# Patient Record
Sex: FEMALE | Race: WHITE
[De-identification: names, ages, dates, MRNs, and addresses within clinical notes are randomized per-mention and may not be internally consistent; named-entity substitution may affect disease eponyms.]

---

## 2020-09-21 ENCOUNTER — HOSPITAL ENCOUNTER (OUTPATIENT)
Dept: HOSPITAL 97 - 2ND | Age: 50
Setting detail: OBSERVATION
LOS: 1 days | Discharge: HOME | End: 2020-09-22
Attending: HOSPITALIST
Payer: COMMERCIAL

## 2020-09-21 VITALS — BODY MASS INDEX: 21.2 KG/M2

## 2020-09-21 DIAGNOSIS — F10.10: ICD-10-CM

## 2020-09-21 DIAGNOSIS — Z83.6: ICD-10-CM

## 2020-09-21 DIAGNOSIS — Z71.6: ICD-10-CM

## 2020-09-21 DIAGNOSIS — Z82.49: ICD-10-CM

## 2020-09-21 DIAGNOSIS — E78.5: ICD-10-CM

## 2020-09-21 DIAGNOSIS — F17.200: ICD-10-CM

## 2020-09-21 DIAGNOSIS — R07.89: Primary | ICD-10-CM

## 2020-09-21 DIAGNOSIS — Z20.828: ICD-10-CM

## 2020-09-21 DIAGNOSIS — I10: ICD-10-CM

## 2020-09-21 LAB — TSH SERPL DL<=0.05 MIU/L-ACNC: 11.9 UIU/ML (ref 0.36–3.74)

## 2020-09-21 PROCEDURE — 84443 ASSAY THYROID STIM HORMONE: CPT

## 2020-09-21 PROCEDURE — 84484 ASSAY OF TROPONIN QUANT: CPT

## 2020-09-21 PROCEDURE — 85730 THROMBOPLASTIN TIME PARTIAL: CPT

## 2020-09-21 PROCEDURE — 36415 COLL VENOUS BLD VENIPUNCTURE: CPT

## 2020-09-21 PROCEDURE — 84100 ASSAY OF PHOSPHORUS: CPT

## 2020-09-21 PROCEDURE — 80061 LIPID PANEL: CPT

## 2020-09-21 PROCEDURE — 85025 COMPLETE CBC W/AUTO DIFF WBC: CPT

## 2020-09-21 PROCEDURE — 80048 BASIC METABOLIC PNL TOTAL CA: CPT

## 2020-09-21 PROCEDURE — 84439 ASSAY OF FREE THYROXINE: CPT

## 2020-09-21 PROCEDURE — 94760 N-INVAS EAR/PLS OXIMETRY 1: CPT

## 2020-09-21 PROCEDURE — 83735 ASSAY OF MAGNESIUM: CPT

## 2020-09-21 PROCEDURE — 85610 PROTHROMBIN TIME: CPT

## 2020-09-21 RX ADMIN — METOPROLOL TARTRATE SCH MG: 25 TABLET ORAL at 17:23

## 2020-09-21 RX ADMIN — Medication SCH ML: at 20:54

## 2020-09-21 RX ADMIN — ENOXAPARIN SODIUM SCH: 40 INJECTION SUBCUTANEOUS at 17:46

## 2020-09-21 NOTE — P.HP
Certification for Inpatient


Patient admitted to: Observation


With expected LOS: <2 Midnights


Practitioner: I am a practitioner with admitting privileges, knowledge of 

patient current condition, hospital course, and medical plan of care.


Services: Services provided to patient in accordance with Admission requirements

found in Title 42 Section 412.3 of the Code of Federal Regulations





Patient History


Date of Service: 09/21/20


Primary Care Provider: Anny Salomon


Reason for admission: Chest pain, rule out ACS


History of Present Illness: 





49-year-old female, PMH:  HTN, HLD, tobacco use, who is a transfer from [facility]

due to concern for ACS.  Patient states she suddenly had left sided chest pain 

this morning at 9:00 a.m. 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

and ordering.  She states she was on the delivery with some physical activity 

when this occurred.  She states it progressively got worse to a 9/10.  She 

states she suddenly broke out into a sweat, and had difficulty breathing.  She 

states the pain radiated to the jaw.  Pain is worsened by deep breathing, only 

slightly improved with some of the medication she was given in the ED.  It is 

currently a 5/10.


EKG in the ED was reported as normal, initial troponin was negative.  Lab work 

was otherwise unremarkable per ED physician's report.  She was given aspirin and

Lovenox.  ED physician spoke with cardiology, Dr. Omalley and will see the 

patient in consult.





Allergies





No Known Allergies Allergy (Unverified 09/21/20 14:52)


   





Home Medications: 








Lisinopril [Zestril] 20 mg PO BID 09/21/20 








- Past Medical/Surgical History


Has patient received pneumonia vaccine in the past: No


Diabetic: No


-: HTN


-: HLD


-: carpal tunnel L hand


-: back surgery


-: skin grafting L hand





- Family History


  ** Father


Notes: copd





  ** Mother


-: Heart disease (CABG in his 30s)


Notes: brain tumor





- Social History


Smoking Status: Current every day smoker (1 pack per day, 38 years)


Counseled patient to stop smoking for: more than 10 minutes


Smoking therapy provided: Yes


Patient receptive to therapy: No


Alcohol use: Yes


CD- Drugs: No


Caffeine use: Yes


Place of Residence: Home





Review of Systems


10-point ROS is otherwise unremarkable





Physical Examination





- Physical Exam


General: Alert, In no apparent distress


HEENT: Mucous membr. moist/pink, Sclerae nonicteric


Neck: Supple, JVD not distended


Respiratory: Clear to auscultation bilaterally, Normal air movement


Cardiovascular: No edema, Regular rate/rhythm, Normal S1 S2, Other (Mild 

tenderness to palpation of mediastinum)


Gastrointestinal: Soft and benign, Non-distended, No tenderness


Musculoskeletal: No erythema, No tenderness


Integumentary: No rashes


Neurological: Normal speech, Normal affect





Assessment and Plan





- Advance Directives


Does patient have a Living Will: No


Does patient have a Durable POA for Healthcare: No


Physician Review Additional Text: 





Chest pain, rule out ACS


Alcohol abuse


HTN


HLD


Tobacco use





Chest pain, rule out ACS


-new onset, persistent, very minimally reproducible on exam with palpation 

(patient reported it was similar but only a 1/10)


-given family history and risk factors, concern for acute coronary syndrome


-continue aspirin, metoprolol, Lovenox, morphine


-cardiology consulted


-echocardiogram ordered


-lipid panel in a.m.


-EKG in a.m.


-NPO at midnight for possible stress test





Alcohol abuse


-patient reports she drinks 3 shots per day


-last went a few days without a drink approximately 1 year ago


-reports no history of alcohol withdrawal


-will monitor on protocol





 HTN


-confirm and continue home lisinopril





HLD


-high-intensity statin for now, can confirm home statin dose





Tobacco use


-offered nicotine patch, patient refused


-counseled on smoking cessation >10 min





Dispo: possible DC tomorrow after above workup





Time Spent Managing Pts Care (In Minutes): 55

## 2020-09-21 NOTE — XMS REPORT
Continuity of Care Document

                          Created on:2020



Patient:CLAUDIA OWENS

Sex:Female

:1970

External Reference #:[id number]





Demographics







                          Address                   [address] [place] [address]

 

                          Home Phone                [phone] CELL

 

                          Work Phone                [phone]

 

                          Mobile Phone              [phone]

 

                          Email Address             [email or web address]

 

                          Preferred Language        en

 

                          Marital Status            Unknown

 

                          Alevism Affiliation     Unknown

 

                          Race                      Unknown

 

                          Additional Race(s)        White



                                                    Unavailable

 

                          Ethnic Group              Unknown









Author







                          Organization              [facility]

t

 

                          Address                   [address]



                                                    [place] [address]

 

                          Phone                     [phone]









Support







                Name            Relationship    Address         Phone

 

                Rodrigo         Other           Unavailable     [phone]

 

                Unavailable     Mother          Unavailable     Unavailable









Care Team Providers







                    Name                Role                Phone

 

                    Radiology           Attending Clinician Unavailable









Problems







       Condition Condition Condition Status Onset  Resolution Last   Treating Co

mments 

Source



       Name   Details Category        Date   Date   Treatment Clinician        



                                                 Date                 

 

       Elevated Elevated Problem Active                                    [facility]

[facility]



       glucose glucose                                                  [facility]



       level  level                                                   [facility]

 

       HTN    HTN    Problem Active                                    [facility]



       (hypertens (hypertens                                                  Debbie

kes -



       ion)   ion)                                                    [facility]

 

       Serum  Serum  Problem Active                                    [facility]



       calcium calcium                                                  [facility]



       elevated elevated                                                  [facility]

a



                                                                      [facility]



                                                                      [facility]

 

       Raynaud's Raynaud's Problem Active                                    [facility]



       syndrome syndrome                                                  [facility] 

-



                                                                      [facility]



                                                                      [facility]

 

       Upper  Upper  Problem Active                                    [facility]



       respirator respirator                                                  Debbie

kes -



       y tract y tract                                                  [facility]



       infection, infection,                                                  l



       unspecifie unspecifie                                                  Ou

tpati



       d type d type                                                  ent



                                                                      Clinics

 

       Cough  Cough  Diagnosis Active                                    [facility]



                                                                      [facility]



                                                                      [facility]



                                                                      [facility]

 

       Hot    Hot    Problem Active                                    [facility]



       flashes flashes                                                  [facility]



       due to due to                                                  [facility]



       menopause menopause                                                  l



                                                                      [facility]

 

       Pure   Pure   Diagnosis Active                                    [facility]



       hyperchole hyperchole                                                  Debbie

kes -



       sterolemia sterolemia                                                  Me

moria



                                                                      l



                                                                      [facility]

 

       Cigarette Cigarette Problem Active                                    [facility]



       nicotine nicotine                                                  [facility] 

-



       dependence dependence                                                  Me

moria



       without without                                                  l



       complicati complicati                                                  Ou

tpati



       on     on                                                      ent



                                                                      Clinics

 

       Screening Screening Problem Active                                    [facility]



       for breast for breast                                                  Debbie

kes -



       cancer cancer                                                  [facility]



                                                                      [facility]



                                                                      [facility]

 

       Abnormal Abnormal Problem Active                                    [facility]

t



       mammogram mammogram                                                  Luke

s -



                                                                      [facility]



                                                                      l



                                                                      [facility]

 

       Seasonal Seasonal Diagnosis Active                                    [facility]



       allergic allergic                                                  [facility] 

-



       rhinitis, rhinitis,                                                  Kermit

vee



       unspecifie unspecifie                                                  l



       d trigger d trigger                                                  Outp

at



                                                                      ent



                                                                      Clinics







Allergies, Adverse Reactions, Alerts

This patient has no known allergies or adverse reactions.



Medications







       Ordered Filled Start  Stop   Current Ordering Indication Dosage Frequency

 Signature

                    Comments            Components          Source



     Medication Medication Date Date Medication? Clinician                (SIG) 

          



     Name Name                                                   

 

     Glucosamine Glucosamine           Yes  Anny                not            

[facility]



                              [place]                defined           [facility]



                                                                 [facility]



                                                                 [facility]



                                                                 [facility]



                                                                 [facility]

 

     Lisinopril Lisinopril           Yes  Anny                take 1           

[facility]



                              [place]                tablet           [facility]



                                                  twice a           oria



                                                  day            l



                                                                 [facility]



                                                                 [facility]

 

     Pravastatin Pravastatin           Yes  Anny                1 tablet       

    [facility]



     Sodium Sodium                [place]                               [facility]



                                                                 [facility]



                                                                 [facility]



                                                                 [facility]



                                                                 [facility]



                                                                 [facility]







Procedures

This patient has no known procedures.



Encounters







        Start   End     Encounter Admission Attending Care    Care    Encounter 

Source



        Date/Time Date/Time Type    Type    Clinicians Facility Department ID   

   

 

        2020-06-10 2020-06-10 Outpatient                 [address]

[id number] [facility]



        08:40:00 08:40:00                         [facility]



                                                                        ent



                                                                        [facility]

 

        2020 [facility]         Radiology [facility]    [id number]

[id number] 



        10:00:00 23:59:00 Encounter                 SPECIALTY [id number]       

  



                                                CARE    [id number]         



                                                CENTER .[id number]         



                                                [address]             



                                                [facility]                   

 

        2020 Outpatient                 [address]old [address]

[id number] [facility]



        09:44:00 09:44:00                         [facility]



                                                                        ent



                                                                        [facility]

 

        2020 [facility]         Radiology [facility]    [id number]

[id number] 



        09:58:00 23:59:00 Encounter                 SPECIALTY [id number]       

  



                                                CARE    [id number]         



                                                CENTER .[id number]         



                                                [address]                   

 

        2020 Outpatient                 [address]

[id number] [facility]



        09:55:00 09:55:00                         [facility]



                                                                        ent



                                                                        [facility]

 

        2020 Outpatient                 [address]

[id number] [facility]



        10:54:00 10:54:00                         [facility]



                                                Medicine                 Outpati



                                                                        ent



                                                                        Clinics

 

        2019 Outpatient                 [facility] [address]

[id number] [facility]



        17:09:00 17:09:00                         t [facility]                 Outpati



                                                                        ent



                                                                        Clinics

 

        2019 Outpatient                 [facility] [address]

[id number] [facility]



        08:20:00 08:20:00                         t [facility]                 Outpati



                                                                        ent



                                                                        Clinics

 

        2019 Outpatient                 [facility] [address]

[id number] [facility]



        16:34:00 16:34:00                         t [facility]                 Outpati



                                                                        ent



                                                                        Clinics

 

        2019 Outpatient                 [facility] [address]

[id number] [facility]



        08:00:00 08:00:00                         t [facility]                 Outpati



                                                                        ent



                                                                        Clinics

 

        2019 Outpatient                 [facility] [address]

[id number] [facility]



        14:21:00 14:21:00                         t [facility]



                                                Medicine                 Outpati



                                                                        ent



                                                                        Clinics

 

        2019 Outpatient                 [facility] [address]

[id number] [facility]



        09:15:00 09:15:00                         t [facility]         [facility]



                                                [facility]    Clinic          [facility]



                                                                        Outpati



                                                                        ent



                                                                        Clinics

 

        2018 Outpatient                 [facility] [address]

[id number] [facility]



        16:42:00 16:42:00                         t [facility]                 Outpati



                                                                        ent



                                                                        Clinics







Results

This patient has no known results.

## 2020-09-21 NOTE — XMS REPORT
Continuity of Care Document

                          Created on:2020



Patient:CLAUDIA OWENS

Sex:Female

:1970

External Reference #:[id number]





Demographics







                          Address                   [address] [place] [address]

 

                          Home Phone                [phone] CELL

 

                          Work Phone                [phone]

 

                          Mobile Phone              [phone]

 

                          Email Address             [email or web address]

 

                          Preferred Language        en

 

                          Marital Status            Unknown

 

                          Anabaptist Affiliation     Unknown

 

                          Race                      Unknown

 

                          Additional Race(s)        White



                                                    Unavailable

 

                          Ethnic Group              Unknown









Author







                          Organization              [facility]

t

 

                          Address                   [address]



                                                    [place] [address]

 

                          Phone                     [phone]









Support







                Name            Relationship    Address         Phone

 

                Rodrigo         Other           Unavailable     [phone]

 

                Unavailable     Mother          Unavailable     Unavailable









Care Team Providers







                    Name                Role                Phone

 

                    Radiology           Attending Clinician Unavailable









Problems







       Condition Condition Condition Status Onset  Resolution Last   Treating Co

mments 

Source



       Name   Details Category        Date   Date   Treatment Clinician        



                                                 Date                 

 

       Elevated Elevated Problem Active                                    [facility]

[facility]



       glucose glucose                                                  [facility]



       level  level                                                   [facility]

 

       HTN    HTN    Problem Active                                    [facility]



       (hypertens (hypertens                                                  Debbie

kes -



       ion)   ion)                                                    [facility]



                                                                      [facility]

 

       Serum  Serum  Problem Active                                    [facility]



       calcium calcium                                                  [facility]



       elevated elevated                                                  [facility]

a



                                                                      [facility]

 

       Raynaud's Raynaud's Problem Active                                    [facility]



       syndrome syndrome                                                  [facility] 

-



                                                                      [facility]

 

       Upper  Upper  Problem Active                                    [facility]



       respirator respirator                                                  Debbie

kes -



       y tract y tract                                                  [facility]



       infection, infection,                                                  l



       unspecifie unspecifie                                                  Ou

tpati



       d type d type                                                  ent



                                                                      Clinics

 

       Cough  Cough  Diagnosis Active                                    [facility]



                                                                      [facility]



                                                                      [facility]

 

       Hot    Hot    Problem Active                                    [facility]



       flashes flashes                                                  [facility]



       due to due to                                                  [facility]



       menopause menopause                                                  [facility]



                                                                      [facility]

 

       Pure   Pure   Diagnosis Active                                    [facility]



       hyperchole hyperchole                                                  Debbie

kes -



       sterolemia sterolemia                                                  Me

moria



                                                                      l



                                                                      [facility]

 

       Cigarette Cigarette Problem Active                                    [facility]



       nicotine nicotine                                                  [facility] 

-



       dependence dependence                                                  Me

moria



       without without                                                  l



       complicati complicati                                                  Ou

tpati



       on     on                                                      ent



                                                                      Clinics

 

       Screening Screening Problem Active                                    [facility]



       for breast for breast                                                  Debbie

kes -



       cancer cancer                                                  [facility]



                                                                      [facility]

 

       Abnormal Abnormal Problem Active                                    [facility]

t



       mammogram mammogram                                                  [facility]

s -



                                                                      [facility]



                                                                      [facility]

 

       Seasonal Seasonal Diagnosis Active                                    [facility]



       allergic allergic                                                  [facility] 

-



       rhinitis, rhinitis,                                                  Kermit

vee



       unspecifie unspecifie                                                  l



       d trigger d trigger                                                  Outp

at



                                                                      ent



                                                                      Clinics







Allergies, Adverse Reactions, Alerts

This patient has no known allergies or adverse reactions.



Medications







       Ordered Filled Start  Stop   Current Ordering Indication Dosage Frequency

 Signature

                    Comments            Components          Source



     Medication Medication Date Date Medication? Clinician                (SIG) 

          



     Name Name                                                   

 

     Glucosamine Glucosamine           Yes  Anny                not            

[facility]



                              Waldorf                defined           [facility]



                                                                 [facility]



                                                                 [facility]

 

     Lisinopril Lisinopril           Yes  Anny                take 1           

[facility]



                              Waldorf                tablet           [facility]



                                                  twice a           oria



                                                  day            l



                                                                 [facility]

 

     Pravastatin Pravastatin           Yes  Anny                1 tablet       

    [facility]



     Sodium Sodium                Waldorf                               [facility]



                                                                 [facility]



                                                                 [facility]



                                                                 [facility]







Procedures

This patient has no known procedures.



Encounters







        Start   End     Encounter Admission Attending Care    Care    Encounter 

Source



        Date/Time Date/Time Type    Type    Clinicians Facility Department ID   

   

 

        2020-06-10 2020-06-10 Outpatient                 [address]

[id number] [facility]



        08:40:00 08:40:00                         [facility]



                                                                        ent



                                                                        [facility]

 

        2020 [facility]         Radiology [facility]    [id number]

[id number] 



        10:00:00 23:59:00 Encounter                 SPECIALTY [id number]       

  



                                                CARE    [id number]         



                                                CENTER .[id number]         



                                                [address]             



                                                [facility]                   

 

        2020 Outpatient                 [address]

[id number] [facility]



        09:44:00 09:44:00                         [facility]



                                                                        ent



                                                                        [facility]

 

        2020 [facility]         Radiology [facility]    [id number]

[id number] 



        09:58:00 23:59:00 Encounter                 SPECIALTY [id number]       

  



                                                CARE    [id number]         



                                                CENTER .[id number]         



                                                [address]                   

 

        2020 Outpatient                 [address]

[id number] [facility]



        09:55:00 09:55:00                         [facility]



                                                                        [facility]



                                                                        [facility]

 

        2020 Outpatient                 [address]

[id number] [facility]



        10:54:00 10:54:00                         [facility]



                                                Medicine                 Outpati



                                                                        ent



                                                                        Clinics

 

        2019 Outpatient                 [facility] [address]

[id number] [facility]



        17:09:00 17:09:00                         t [facility]                 Outpati



                                                                        ent



                                                                        Clinics

 

        2019 Outpatient                 [facility] [address]

[id number] [facility]



        08:20:00 08:20:00                         t [facility]                 Outpati



                                                                        ent



                                                                        Clinics

 

        2019 Outpatient                 [facility] [address]

[id number] [facility]



        16:34:00 16:34:00                         t [facility]                 Outpati



                                                                        ent



                                                                        Clinics

 

        2019 Outpatient                 [facility] [address]

[id number] [facility]



        08:00:00 08:00:00                         t [facility]                 Outpati



                                                                        ent



                                                                        Clinics

 

        2019 Outpatient                 [facility] [address]

[id number] [facility]



        14:21:00 14:21:00                         t [facility]



                                                Medicine                 Outpati



                                                                        ent



                                                                        Clinics

 

        2019 Outpatient                 [facility] [address]

[id number] [facility]



        09:15:00 09:15:00                         t [facility]         [facility]



                                                [facility]    Clinic          [facility]



                                                                        Outpati



                                                                        ent



                                                                        Clinics

 

        2018 Outpatient                 [facility] [address]

[id number] [facility]



        16:42:00 16:42:00                         t [facility]                 Outpati



                                                                        ent



                                                                        Clinics







Results

This patient has no known results.

## 2020-09-22 VITALS — SYSTOLIC BLOOD PRESSURE: 122 MMHG | TEMPERATURE: 97.6 F | DIASTOLIC BLOOD PRESSURE: 66 MMHG

## 2020-09-22 VITALS — OXYGEN SATURATION: 98 %

## 2020-09-22 LAB
BUN BLD-MCNC: 15 MG/DL (ref 7–18)
GLUCOSE SERPLBLD-MCNC: 78 MG/DL (ref 74–106)
HCT VFR BLD CALC: 30 % (ref 36–45)
HDLC SERPL-MCNC: 117 MG/DL (ref 40–60)
INR BLD: 0.89
LDLC SERPL CALC-MCNC: 63 MG/DL (ref ?–130)
LYMPHOCYTES # SPEC AUTO: 2.5 K/UL (ref 0.7–4.9)
MAGNESIUM SERPL-MCNC: 1.8 MG/DL (ref 1.8–2.4)
PMV BLD: 7.2 FL (ref 7.6–11.3)
POTASSIUM SERPL-SCNC: 3.9 MMOL/L (ref 3.5–5.1)
RBC # BLD: 3.11 M/UL (ref 3.86–4.86)

## 2020-09-22 RX ADMIN — METOPROLOL TARTRATE SCH: 25 TABLET ORAL at 03:15

## 2020-09-22 RX ADMIN — Medication SCH ML: at 09:00

## 2020-09-22 RX ADMIN — ENOXAPARIN SODIUM SCH MG: 40 INJECTION SUBCUTANEOUS at 09:35

## 2020-09-22 NOTE — P.DS
Admission Date: 09/21/20


Discharge Date: 09/22/20


Primary Care Provider: Anny Salomon


Discharge Condition: FAIR


Reason for Admission: Chest pain, rule out ACS


Brief History of Present Illness: 


49-year-old woman with a history of hypertension and hyperlipidemia presented to

the emergency department with a complaint of chest pain of sudden onset.  Workup

in the ED with troponin was negative, chest x-ray did not show any acute 

disease, EKG no significant ischemic changes.  The patient was placed under 

observation for ACS rule out.





Hospital Course: 


Troponin trended negative.  Patient was chest pain-free during the hospital 

stay.  The patient was seen in consultation by cardiology-Dr. Omalley.  Stress 

test was recommended which could not be done today because equipment 

maintenance. Dr. Omalley recommend outpatient stress test.  Patient requests to 

go home today.  She is discharged to do stress test as an outpatient.





Vital Signs/Physical Exam: 














Temp Pulse Resp BP Pulse Ox


 


 97.6 F   66   16   122/66   98 


 


 09/22/20 08:00  09/22/20 09:00  09/22/20 08:00  09/22/20 09:00  09/22/20 08:00








General: Alert, In no apparent distress


Neck: Supple


Respiratory: Clear to auscultation bilaterally, Normal air movement


Cardiovascular: No edema, Regular rate/rhythm, Normal S1 S2


Gastrointestinal: Normal bowel sounds, Soft and benign


Musculoskeletal: No swelling


Integumentary: No rashes


Neurological: Other (Nonfocal)


Laboratory Data at Discharge: 














WBC  7.7 K/uL (4.3-10.9)   09/22/20  03:49    


 


Hgb  10.6 g/dL (12.0-15.0)  L  09/22/20  03:49    


 


Hct  30.0 % (36.0-45.0)  L  09/22/20  03:49    


 


Plt Count  219 K/uL (152-406)   09/22/20  03:49    


 


PT  10.5 SECONDS (9.5-12.5)   09/22/20  03:49    


 


INR  0.89   09/22/20  03:49    


 


APTT  33.1 SECONDS (24.3-36.9)   09/22/20  03:49    


 


Sodium  137 mmol/L (136-145)   09/22/20  03:49    


 


Potassium  3.9 mmol/L (3.5-5.1)   09/22/20  03:49    


 


BUN  15 mg/dL (7-18)   09/22/20  03:49    


 


Creatinine  0.67 mg/dL (0.55-1.3)   09/22/20  03:49    


 


Glucose  78 mg/dL ()   09/22/20  03:49    


 


Phosphorus  Cancelled   09/22/20  05:00    


 


Magnesium  1.8 mg/dL (1.8-2.4)   09/22/20  03:49    


 


Troponin I  < 0.02 ng/mL (0.0-0.045)   09/21/20  22:05    


 


Triglycerides  63 mg/dL (<150)   09/22/20  03:49    


 


Cholesterol  193 mg/dL (<200)   09/22/20  03:49    


 


HDL Cholesterol  117 mg/dL (40-60)  H  09/22/20  03:49    


 


Cholesterol/HDL Ratio  1.65   09/22/20  03:49    








Home Medications: 








Lisinopril [Zestril] 20 mg PO BID 09/21/20 


Aspirin [Aspirin EC 81 MG] 81 mg PO DAILY #30 tablet. 09/22/20 





New Medications: 


Aspirin [Aspirin EC 81 MG] 81 mg PO DAILY #30 tablet.


Diet: AHA


Activity: Ad renato


Followup: 


Satinder Omalley MD [ACTIVE - CAN ADMIT] - 2-3 Days

## 2020-09-23 NOTE — CON
Date of Consultation:  09/22/2020



Reason For Consultation:  Chest pain.



History Of Present Illness:  Ms. Barr is a 49-year-old woman has a history of hypertension, dyslipi
demia, tobacco use.  She only takes lisinopril 20 mg b.i.d. at home for her blood pressure.  She work
s for [employer].  She lives in [place].  While she was working, she developed this substernal chest 
pressure that would get worse when she took a deep breath.  She got short of breath.  No nausea or vo
miting, but she did have some diaphoresis.  Denied any PND, orthopnea, pedal edema, palpitation, or s
yncope.  By the time she came to the emergency room at [place], she has had pain for the last 48 hours.
  Her EKG remained normal, her chest x-ray was normal, and her troponin was negative.  Dr. Castillo w
as still concerned because of her symptoms and she was sent to the [facility] for [facility]
er evaluation and treatment.



Past Medical History:  As stated above.



Allergies:  NONE.



Review of Systems:

Negative.



Social History:  Positive for tobacco.



Family History:  Positive for heart disease.



Physical Examination:

Vital Signs:  Stable.  She was afebrile. 

HEENT:  Negative. 

Neck:  Supple with no bruit. 

Chest:  Clear. 

Cardiac:  Revealed a regular rhythm and rate.  No murmurs, gallops, or rubs. 

Abdomen:  Benign. 

Extremities:  Revealed no clubbing, cyanosis, or edema.



Diagnostic Data:  Clearly normal except for TSH of 11.9.



Impression And Plan:  Atypical chest pain.  Her symptoms have been going on for more than 2 days and 
yet still she has normal EKG and a normal troponin.  Her chest x-ray was normal.  Her symptoms are mo
re pleuritic as her pain gets worse when she breathes.  This certainly could be gastric as well.  Nev
ertheless, she has many risk factors for heart disease including family history, positive tobacco, hy
pertension, and dyslipidemia.  I think she needs to have an echocardiogram and a nuclear stress test 
before making further decisions.  Her TSH is elevated and she may need to be on a low-dose Synthroid,
 but I would leave that up to Dr. Greene to decide.





NB/HARRISON

DD:  09/23/2020 03:51:15Voice ID:  [id number]

DT:  09/23/2020 04:32:25Report ID:  [id number]

## 2021-01-18 ENCOUNTER — HOSPITAL ENCOUNTER (EMERGENCY)
Dept: HOSPITAL 97 - ER | Age: 51
Discharge: HOME | End: 2021-01-18
Payer: COMMERCIAL

## 2021-01-18 VITALS — DIASTOLIC BLOOD PRESSURE: 70 MMHG | SYSTOLIC BLOOD PRESSURE: 128 MMHG | TEMPERATURE: 98.8 F | OXYGEN SATURATION: 97 %

## 2021-01-18 DIAGNOSIS — R07.9: Primary | ICD-10-CM

## 2021-01-18 DIAGNOSIS — I10: ICD-10-CM

## 2021-01-18 DIAGNOSIS — Z20.822: ICD-10-CM

## 2021-01-18 DIAGNOSIS — F17.210: ICD-10-CM

## 2021-01-18 DIAGNOSIS — R06.02: ICD-10-CM

## 2021-01-18 LAB
ALBUMIN SERPL BCP-MCNC: 4.2 G/DL (ref 3.4–5)
ALP SERPL-CCNC: 70 U/L (ref 45–117)
ALT SERPL W P-5'-P-CCNC: 46 U/L (ref 12–78)
AST SERPL W P-5'-P-CCNC: 38 U/L (ref 15–37)
BUN BLD-MCNC: 12 MG/DL (ref 7–18)
GLUCOSE SERPLBLD-MCNC: 80 MG/DL (ref 74–106)
HCT VFR BLD CALC: 31 % (ref 36–45)
INR BLD: 0.92
LYMPHOCYTES # SPEC AUTO: 2.2 K/UL (ref 0.7–4.9)
MAGNESIUM SERPL-MCNC: 1.4 MG/DL (ref 1.8–2.4)
NT-PROBNP SERPL-MCNC: 109 PG/ML (ref ?–125)
PMV BLD: 7.6 FL (ref 7.6–11.3)
POTASSIUM SERPL-SCNC: 3.9 MMOL/L (ref 3.5–5.1)
RBC # BLD: 3.22 M/UL (ref 3.86–4.86)
SARS-COV-2 RNA RESP QL NAA+PROBE: NEGATIVE
TROPONIN (EMERG DEPT USE ONLY): < 0.02 NG/ML (ref 0–0.04)

## 2021-01-18 PROCEDURE — 80048 BASIC METABOLIC PNL TOTAL CA: CPT

## 2021-01-18 PROCEDURE — 0240U: CPT

## 2021-01-18 PROCEDURE — 96372 THER/PROPH/DIAG INJ SC/IM: CPT

## 2021-01-18 PROCEDURE — 83880 ASSAY OF NATRIURETIC PEPTIDE: CPT

## 2021-01-18 PROCEDURE — 71045 X-RAY EXAM CHEST 1 VIEW: CPT

## 2021-01-18 PROCEDURE — 85025 COMPLETE CBC W/AUTO DIFF WBC: CPT

## 2021-01-18 PROCEDURE — 96375 TX/PRO/DX INJ NEW DRUG ADDON: CPT

## 2021-01-18 PROCEDURE — 96374 THER/PROPH/DIAG INJ IV PUSH: CPT

## 2021-01-18 PROCEDURE — 93005 ELECTROCARDIOGRAM TRACING: CPT

## 2021-01-18 PROCEDURE — 85610 PROTHROMBIN TIME: CPT

## 2021-01-18 PROCEDURE — 85379 FIBRIN DEGRADATION QUANT: CPT

## 2021-01-18 PROCEDURE — 80076 HEPATIC FUNCTION PANEL: CPT

## 2021-01-18 PROCEDURE — 83735 ASSAY OF MAGNESIUM: CPT

## 2021-01-18 PROCEDURE — 36415 COLL VENOUS BLD VENIPUNCTURE: CPT

## 2021-01-18 PROCEDURE — 84484 ASSAY OF TROPONIN QUANT: CPT

## 2021-01-18 PROCEDURE — 71275 CT ANGIOGRAPHY CHEST: CPT

## 2021-01-18 PROCEDURE — 99285 EMERGENCY DEPT VISIT HI MDM: CPT

## 2021-01-18 NOTE — XMS REPORT
Continuity of Care Document

                           Created on:2021



Patient:CLAUDIA OWENS

Sex:Female

:1970

External Reference #:[id number]





Demographics







                          Address                   [address] [place] [address]

 

                          Home Phone                [phone] CELL

 

                          Work Phone                [phone]

 

                          Mobile Phone              [phone]

 

                          Email Address             [email or web address]"

 

                          Preferred Language        en

 

                          Marital Status            Unknown

 

                          Hindu Affiliation     Unknown

 

                          Race                      Unknown

 

                          Additional Race(s)        White



                                                    Unavailable

 

                          Ethnic Group              Unknown









Author







                          Organization              [facility]

t

 

                          Address                   [address]



                                                    [place] [address]

 

                          Phone                     [phone]









Support







                Name            Relationship    Address         Phone

 

                Cortney          Mother          [address] [facility] [address] [facility] [phone]

[phone]



                                                [place] [address] 

 

                Rodrigo         Other           Unavailable     [phone]









Care Team Providers







                    Name                Role                Phone

 

                    Radiology           Attending Clinician Unavailable









Problems

This patient has no known problems.



Allergies, Adverse Reactions, Alerts

This patient has no known allergies or adverse reactions.



Medications







       Ordered Filled Start  Stop   Current Ordering Indication Dosage Frequency

 Signature

                    Comments            Components          Source



     Medication Medication Date Date Medication? Clinician                (SIG) 

          



     Name Name                                                   

 

     Glucosamine Glucosamine           Yes  Anny                not            

[facility]



                              [place]                defined           [facility]



                                                                 [facility]



                                                                 [facility]



                                                                 [facility]



                                                                 [facility]



                                                                 Clinics

 

     Lisinopril Lisinopril           Yes  Anny                take 1           

[facility]



                              [place]                tablet           [facility]



                                                  tw[facility]           [facility]



                                                  day            l



                                                                 [facility]



                                                                 ent



                                                                 Clinics

 

     Pravastatin Pravastatin           Yes  Anny                1 tablet       

    [facility]



     Sodium Sodium                [place]                               [facility]



                                                                 [facility]



                                                                 l



                                                                 [facility]



                                                                 ent



                                                                 Clinics







Procedures

This patient has no known procedures.



Encounters







        Start   End     Encounter Admission Attending Care    Care    Encounter 

Source



        Date/Time Date/Time Type    Type    Clinicians Facility Department ID   

   

 

        2021 Outpatient                 [facility]  [id number]

 [facility]



        00[facility]00[facility]00 00[facility]00:00                                                 [facility]



                                                                        l



                                                                        [facility]pat[facility]



                                                                        ent



                                                                        Clinics

 

        2020 Outpatient                 [facility]  [id number]

 [facility]



        00:00:00 00:00:00                                                 [facility]



                                                                        l



                                                                        Outpati



                                                                        ent



                                                                        Clinics

 

        2020 Outpatient                 [facility]  [id number]

 [facility]



        00:00:00 00:00:00                                                 [facility]



                                                                        [facility]



                                                                        [facility]



                                                                        [facility]



                                                                        Clinics

 

        2020-06-10 2020-06-10 Outpatient                 [facility]

[id number] [facility]



        08:40:00 08:40:00                         [facility]  Medicine         l



                                                Medicine                 Outpati



                                                                        ent



                                                                        Clinics

 

        2020 [facility]         Radiology [facility]    [id number]

[id number] 



        10:00:00 23:59:00 Encounter                 SPECIALTY [id number]       

  



                                                CARE    [id number]         



                                                CENTER .[id number]         



                                                [facility] [address]             



                                                [facility]                   

 

        2020 Outpatient                 [facility]

[id number] [facility]



        09:44:00 09:44:00                         [facility]



                                                Medicine                 Outpati



                                                                        ent



                                                                        Clinics

 

        2020 [facility]         Radiology [facility]    [id number]

[id number] 



        09:58:00 23:59:00 Encounter                 SPECIALTY [id number]       

  



                                                CARE    [id number]         



                                                CENTER .[id number]         



                                                [facility] [address] [place] [address]                   

 

        2020 Outpatient                 [facility]

[id number] [facility]



        09:55:00 09:55:00                         [facility]



                                                Medicine                 Outpati



                                                                        ent



                                                                        Clinics

 

        2020 Outpatient                 [facility]

[id number] [facility]



        10:54:00 10:54:00                         [facility]



                                                Medicine                 Outpati



                                                                        ent



                                                                        Clinics

 

        2019 Outpatient                 [facility]

[id number] [facility]



        17:09:00 17:09:00                         [facility]  Medicine         l



                                                Medicine                 Outpati



                                                                        ent



                                                                        Clinics

 

        2019 Outpatient                 [facility]

[id number] [facility]



        08:20:00 08:20:00                         [facility]



                                                Medicine                 Outpati



                                                                        ent



                                                                        Clinics

 

        2019 Outpatient                 [facility]

[id number] [facility]



        16:34:00 16:34:00                         [facility]



                                                Medicine                 Outpati



                                                                        ent



                                                                        Clinics

 

        2019 Outpatient                 [facility]

[id number] [facility]



        08:00:00 08:00:00                         t [facility]



                                                                        ent



                                                                        Clinics

 

        2019 Outpatient                 [address]

[id number] [facility]



        14:21:00 14:21:00                         t [facility]                 [facility]



                                                                        ent



                                                                        Clinics

 

        2019 Outpatient                 [address]

[id number] [facility]



        09:15:00 09:15:00                         t [facility]         [facility]



                                                                        [facility]



                                                                        ent



                                                                        Clinics

 

        2018 Outpatient                 [address]

[id number] [facility]



        16:42:00 16:42:00                         t [facility]



                                                                        ent



                                                                        Clinics







Results

This patient has no known results.

## 2021-01-18 NOTE — ER
Nurse's Notes                                                                                     

 [facility]                                                                 

Name: Radha Barr                                                                                

Age: 50 yrs                                                                                       

Sex: Female                                                                                       

: 1970                                                                                   

MRN: [id number]                                                                                   

Arrival Date: 2021                                                                          

Time: 08:56                                                                                       

Account#: [id number]                                                                            

Bed 17                                                                                            

Private MD:                                                                                       

Diagnosis: Chest pain, unspecified                                                                

                                                                                                  

Presentation:                                                                                     

                                                                                             

09:02 Chief complaint: Patient states: midsternal chest pain started yesterday, has been      iw  

      constant, non radiating, feels like she can't breathe and it hurts to take a deep           

      breath, smokes daily. Coronavirus screen: At this time, the client does not indicate        

      any symptoms associated with coronavirus-19. Ebola Screen: Patient negative for fever       

      greater than or equal to 101.5 degrees Fahrenheit, and additional compatible Ebola          

      Virus Disease symptoms Patient denies exposure to infectious person. Patient denies         

      travel to an Ebola-affected area in the 21 days before illness onset. No symptoms or        

      risks identified at this time. Initial Sepsis Screen: Does the patient meet any 2           

      criteria? No. Patient's initial sepsis screen is negative. Does the patient have a          

      suspected source of infection? No. Patient's initial sepsis screen is negative. Risk        

      Assessment: Do you want to hurt yourself or someone else? Patient reports no desire to      

      harm self or others. Onset of symptoms was 2021.                                

09:02 Method Of Arrival: Ambulatory                                                           iw  

09:02 Acuity: DONTRELL 3                                                                           iw  

                                                                                                  

Historical:                                                                                       

- Allergies:                                                                                      

09:07 No Known Allergies;                                                                     iw  

- Home Meds:                                                                                      

09:07 pravastatin oral oral once daily [Active]; lisinopril 20 mg Oral tab twice a day        iw  

      [Active];                                                                                   

- PMHx:                                                                                           

09:07 Hypertension;                                                                           iw  

- PSHx:                                                                                           

09:07 back; Carpal Tunnel Repair;                                                             iw  

                                                                                                  

- Immunization history:: Adult Immunizations not up to date.                                      

- Social history:: Smoking status: Patient reports the use of cigarette tobacco                   

  products, smokes one pack cigarettes per day.                                                   

                                                                                                  

                                                                                                  

Screenin:15 Abuse screen: Denies threats or abuse. Denies injuries from another. Nutritional        ss  

      screening: No deficits noted. Tuberculosis screening: Never had TB. Fall Risk None          

      identified.                                                                                 

                                                                                                  

Assessment:                                                                                       

09:15 General: Appears in no apparent distress. comfortable, Behavior is calm, cooperative.   ss  

      Pain: Complains of pain in mid-sternal area Pain does not radiate. Pain currently is 0      

      out of 10 on a pain scale. at worst was 7 out of 10 on a pain scale. Quality of pain is     

      described as sharp, Pain began 1 day ago. Is episodic, Aggravated by increased              

      activity, repositioning. Neuro: Level of Consciousness is awake, alert, obeys commands,     

      Oriented to person, place, time, situation. Cardiovascular: Capillary refill < 3            

      seconds is brisk in bilateral fingers Patient's skin is warm and dry. Respiratory:          

      Respiratory effort is even, unlabored, Breath sounds are clear bilaterally. GI: No          

      signs and/or symptoms were reported involving the gastrointestinal system. : No signs     

      and/or symptoms were reported regarding the genitourinary system. EENT: Oral mucosa is      

      moist. Derm: Skin is pink, warm \T\ dry. normal.                                            

11:41 Reassessment: Pt is back from CT. Awaiting results.                                     ss  

12:30 Reassessment: Patient appears in no apparent distress at this time. Patient and/or      ss  

      family updated on plan of care and expected duration. Pain level reassessed.                

                                                                                                  

Vital Signs:                                                                                      

09:02  / 76; Pulse 92; Resp 16; Pulse Ox 100% on R/A; Weight 53.48 kg; Height 5 ft. 3   iw  

      in. (160.02 cm); Pain 7/10;                                                                 

10:32  / 70; Pulse 81; Resp 15; Pulse Ox 97% on R/A;                                    ss  

10:38 Temp 98.8;                                                                              ss  

09:02 Body Mass Index 20.88 (53.48 kg, 160.02 cm)                                             iw  

10:38 Pt reports her pain is a zero, until she moves then it is a 7                           ss  

                                                                                                  

ED Course:                                                                                        

08:56 Patient arrived in ED.                                                                  as  

09:04 Triage completed.                                                                       iw  

09:07 Arm band placed on.                                                                     iw  

09:08 Jose Alva PA is PHCP.                                                              jmm 

09:08 Devendra Castillo MD is Attending Physician.                                             jmm 

09:15 Patient has correct armband on for positive identification. Bed in low position. Call   ss  

      light in reach. Side rails up X 1. Cardiac monitor on. Pulse ox on. NIBP on. Warm           

      blanket given.                                                                              

09:48 XRAY Chest (1 view) In Process Unspecified.                                             EDMS

10:04 Inserted saline lock: 20 gauge in right forearm, using aseptic technique. Blood         ss  

      collected. Patient maintains SpO2 saturation greater than 95% on room air.                  

10:13 Nissa Humphreys, RN is Primary Nurse.                                                    ss  

11:34 CT Chest For PE Angio In Process Unspecified.                                           EDMS

13:00 No provider procedures requiring assistance completed. IV discontinued, intact,         ss  

      bleeding controlled, No redness/swelling at site. Pressure dressing applied.                

                                                                                                  

Administered Medications:                                                                         

10:30 Drug: Zofran (Ondansetron) 4 mg Route: IVP; Site: right forearm;                        ss  

12:29 Follow up: Response: No adverse reaction; Nausea is decreased                           ss  

10:32 Drug: morphine 4 mg Route: IVP; Site: right forearm;                                    ss  

12:29 Follow up: Response: No adverse reaction; Pain is decreased                             ss  

12:42 Not Given (Other Intervention Used): Ketorolac 30 mg IVP once                           ss  

12:43 Drug: Ketorolac 30 mg Route: IM; Site: right gluteus;                                   ss  

13:02 Follow up: Response: No adverse reaction; Medication administered at discharge.         ss  

                                                                                                  

                                                                                                  

Outcome:                                                                                          

12:18 Discharge ordered by MD.                                                                evita 

13:00 Discharged to home ambulatory.                                                          ss  

13:00 Condition: good                                                                             

13:00 Discharge instructions given to patient, Instructed on discharge instructions, follow       

      up and referral plans. medication usage, Demonstrated understanding of instructions,        

      follow-up care, medications, Prescriptions given X 2.                                       

13:02 Patient left the ED.                                                                    ss  

                                                                                                  

Signatures:                                                                                       

Dispatcher [employer]                           EDMS                                                 

Jose Alva PA PA jmm Martinez, Amelia as Williams, Irene, RN RN                                                      

Nissa Humphreys RN                      RN                                                      

                                                                                                  

**************************************************************************************************

## 2021-01-18 NOTE — RAD REPORT
EXAM DESCRIPTION:  CT - Chest For Pe Angio - 1/18/2021 11:34 am

 

CLINICAL HISTORY:   CHEST PAIN

 

COMPARISON:  Chest Single View dated 1/18/2021

 

TECHNIQUE:  Dynamically enhanced 3 mm thick images of the chest were obtained during administration o
f approximately 150mL Isovue 370 IV contrast. Coronal and oblique MIP reconstruction images were gene
rated and reviewed. Exam utilizes a protocol to evaluate the pulmonary arterial tree.

 

All CT scans are performed using dose optimization technique as appropriate and may include automated
 exposure control or mA/KV adjustment according to patient size.

 

FINDINGS:  No pulmonary emboli are identified.

 

The aorta as imaged shows no acute or suspicious finding. No pericardial thickening or effusion.

 

Lung base interstitial markings are accentuated by motion. This could potentially mask minimal edema 
or infiltrate. Interstitial markings in the mid and upper lung fields are normal range. No pleural ef
fusion or pleural thickening. Heart size is enlarged.

 

No mediastinal or hilar suspicious masses. No chest wall masses or abnormal axillary lymphadenopathy.


 

IMPRESSION:  No pulmonary emboli identified.

 

Mild cardiomegaly without pericardial effusion.

 

Prominent interstitial pattern in each lung base believed to be the affects of respiratory motion rat
her than infiltrate.

## 2021-01-18 NOTE — EDPHYS
Physician Documentation                                                                           

 [facility]                                                                 

Name: Radha Barr                                                                                

Age: 50 yrs                                                                                       

Sex: Female                                                                                       

: 1970                                                                                   

MRN: [id number]                                                                                   

Arrival Date: 2021                                                                          

Time: 08:56                                                                                       

Account#: [id number]                                                                            

Bed 17                                                                                            

Private MD:                                                                                       

ED Physician Devendra Castillo                                                                      

HPI:                                                                                              

                                                                                             

09:36 This 50 yrs old  Female presents to ER via Ambulatory with complaints of Chest [facility] 

      Pain, Shortness Of Breath.                                                                  

09:36 The patient or guardian reports chest pain that is located primarily in the substernal  [facility] 

      area. Onset: gradually, 1 day(s) ago. The pain does not radiate. The chest pain is          

      described as sharp. Duration: The patient or guardian reports a single episode.             

      Modifying factors: The symptoms are alleviated by remaining still, the symptoms are         

      aggravated by movement. This is a 50 year old female with a history of htn that             

      presents to the ED with complaints of midsternal chest pain beginning approx 1 day ago.     

      Pain has been constant. Worsened with movement. Similar episode this past September. .      

                                                                                                  

Historical:                                                                                       

- Allergies:                                                                                      

09:07 No Known Allergies;                                                                     iw  

- Home Meds:                                                                                      

09:07 pravastatin oral oral once daily [Active]; lisinopril 20 mg Oral tab twice a day        iw  

      [Active];                                                                                   

- PMHx:                                                                                           

09:07 Hypertension;                                                                           iw  

- PSHx:                                                                                           

09:07 back; Carpal Tunnel Repair;                                                             iw  

                                                                                                  

- Immunization history:: Adult Immunizations not up to date.                                      

- Social history:: Smoking status: Patient reports the use of cigarette tobacco                   

  products, smokes one pack cigarettes per day.                                                   

                                                                                                  

                                                                                                  

ROS:                                                                                              

09:36 Constitutional: Negative for fever, chills, and weight loss.                            jmm 

09:36 Respiratory: Negative for shortness of breath, cough, wheezing, and pleuritic chest         

      pain, Abdomen/GI: Negative for abdominal pain, nausea, vomiting, diarrhea, and              

      constipation, Neuro: Negative for headache, weakness, numbness, tingling, and seizure.      

09:36 Cardiovascular: Positive for chest pain.                                                    

09:36 All other systems are negative.                                                             

                                                                                                  

Exam:                                                                                             

09:36 Constitutional:  This is a well developed, well nourished patient who is awake, alert,  jmm 

      and in no acute distress. Head/Face:  atraumatic. Eyes:  EOMI, no conjunctival erythema     

      appreciated ENT:  Moist Mucus Membranes Neck:  Trachea midline, Supple                      

09:36 Cardiovascular:  Regular rate and rhythm.  No edema appreciated Respiratory:  Normal        

      respirations, no respiratory distress appreciated Abdomen/GI:  Non distended, soft          

      Back:  Normal ROM Skin:  General appearance color normal MS/ Extremity:  Moves all          

      extremities, no obvious deformities appreciated, no edema noted to the lower                

      extremities  Neuro:  Awake and alert, normal gait Psych:  Behavior is normal, Mood is       

      normal, Patient is cooperative and pleasant                                                 

09:36 Chest/axilla: Inspection: normal, Palpation: tenderness, that is moderate, of the           

      mid-sternal area, that totally reproduces the patient's complaints.                         

10:14 ECG was reviewed by the Attending Physician.                                            [facility] 

                                                                                                  

Vital Signs:                                                                                      

09:02  / 76; Pulse 92; Resp 16; Pulse Ox 100% on R/A; Weight 53.48 kg; Height 5 ft. 3   iw  

      in. (160.02 cm); Pain 7/10;                                                                 

10:32  / 70; Pulse 81; Resp 15; Pulse Ox 97% on R/A;                                    ss  

10:38 Temp 98.8;                                                                              ss  

09:02 Body Mass Index 20.88 (53.48 kg, 160.02 cm)                                             iw  

10:38 Pt reports her pain is a zero, until she moves then it is a 7                           ss  

                                                                                                  

MDM:                                                                                              

09:09 Patient medically screened.                                                             [facility] 

12:17 Data reviewed: vital signs, nurses notes. Counseling: I had a detailed discussion with  madelyn 

      the patient and/or guardian regarding: the historical points, exam findings, and any        

      diagnostic results supporting the discharge/admit diagnosis, lab results, radiology         

      results, the need for outpatient follow up, to return to the emergency department if        

      symptoms worsen or persist or if there are any questions or concerns that arise at          

      home. ED course: HEART SCORE = 3.                                                           

                                                                                                  

                                                                                             

09:32 Order name: Basic Metabolic Panel; Complete Time: 11:11                                 [facility] 

                                                                                             

09:32 Order name: CBC with Diff; Complete Time: 10:17                                         [facility] 

                                                                                             

09:32 Order name: LFT's; Complete Time: 11:11                                                 [facility] 

                                                                                             

09:32 Order name: Magnesium; Complete Time: 11:11                                             [facility] 

                                                                                             

09:32 Order name: NT PRO-BNP; Complete Time: 11:11                                            [facility] 

                                                                                             

09:32 Order name: PT-INR; Complete Time: 10:35                                                [facility] 

                                                                                             

09:32 Order name: Troponin (emerg Dept Use Only); Complete Time: 11:11                        [facility] 

                                                                                             

09:32 Order name: XRAY Chest (1 view); Complete Time: 10:07                                   [facility] 

                                                                                             

09:32 Order name: D-Dimer; Complete Time: 10:35                                               [facility] 

                                                                                             

10:35 Order name: CT Chest For PE Angio; Complete Time: 11:56                                 [facility] 

                                                                                             

11:21 Order name: COVID-19/FLU A+B; Complete Time: 11:25                                      [facility]

                                                                                             

09:32 Order name: EKG; Complete Time: 09:33                                                   [facility] 

                                                                                             

09:32 Order name: Cardiac monitoring; Complete Time: 10:32                                    [facility] 

                                                                                             

09:32 Order name: EKG - Nurse/Tech; Complete Time: 10:32                                      [facility] 

                                                                                             

09:32 Order name: IV Saline Lock; Complete Time: 10:33                                        [facility] 

                                                                                             

09:32 Order name: Labs collected and sent; Complete Time: 10:33                               [facility] 

                                                                                             

09:32 Order name: O2 Per Protocol; Complete Time: 10:33                                       [facility] 

                                                                                             

09:32 Order name: O2 Sat Monitoring; Complete Time: 10:33                                     [facility] 

                                                                                                  

ECG:                                                                                              

10:14 Rate is 81 beats/min. Rhythm is regular. QRS Axis is Normal. SC interval is normal. QRS jmm 

      interval is normal. QT interval is normal. No Q waves. T waves are Normal. No ST            

      changes noted. Reviewed by me.                                                              

                                                                                                  

Administered Medications:                                                                         

10:30 Drug: Zofran (Ondansetron) 4 mg Route: IVP; Site: right forearm;                        ss  

12:29 Follow up: Response: No adverse reaction; Nausea is decreased                           ss  

10:32 Drug: morphine 4 mg Route: IVP; Site: right forearm;                                    ss  

12:29 Follow up: Response: No adverse reaction; Pain is decreased                             ss  

12:42 Not Given (Other Intervention Used): Ketorolac 30 mg IVP once                           ss  

12:43 Drug: Ketorolac 30 mg Route: IM; Site: right gluteus;                                   ss  

13:02 Follow up: Response: No adverse reaction; Medication administered at discharge.         ss  

                                                                                                  

                                                                                                  

Disposition:                                                                                      

18:57 Co-signature as Attending Physician, Devendra Castillo MD I agree with the assessment and  lizette 

      plan of care.                                                                               

                                                                                                  

Disposition:                                                                                      

21 12:18 Discharged to Home. Impression: Chest pain, unspecified.                           

- Condition is Stable.                                                                            

- Discharge Instructions: Nonspecific Chest Pain.                                                 

- Prescriptions for Medrol (Sher) 4 mg Oral Tablets, Dose Pack - take 1 tablet by ORAL             

  route as directed - follow package instructions; 1 packet. orphenadrine citrate 100             

  mg Oral Tablet Sustained Release - take 1 tablet by ORAL route 2 times per day As               

  needed; 20 tablet.                                                                              

- Medication Reconciliation Form, Thank You Letter, Antibiotic Education, Prescription            

  Opioid Use, Work release form form.                                                             

- Follow up: Private Physician; When: 2 - 3 days; Reason: Recheck today's complaints,             

  Continuance of care, Re-evaluation by your physician.                                           

                                                                                                  

                                                                                                  

                                                                                                  

Signatures:                                                                                       

Dispatcher [employer]                           EDMS                                                 

Devendra Castillo MD MD cha Mickail, Joel, PA PA jmm Williams, Irene, CAMILO PAGE   iw                                                   

Nissa Humphreys RN RN   ss                                                   

                                                                                                  

Corrections: (The following items were deleted from the chart)                                    

10:26 10:05 CORONAVIRUS+MR.LAB.BRZ ordered. EDMS                                              EDMS

10:27 10:05 Influenza Screen (A \T\ B)+BA.LAB.BRZ ordered. EDMS                                 EDMS

13:02 12:18 2021 12:18 Discharged to Home. Impression: Chest pain, unspecified.         ss  

      Condition is Stable. Forms are Medication Reconciliation Form, Thank You Letter,            

      Antibiotic Education, Prescription Opioid Use. Follow up: Private Physician; When: 2 -      

      3 days; Reason: Recheck today's complaints, Continuance of care, Re-evaluation by your      

      physician. evita                                                                              

                                                                                                  

**************************************************************************************************
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
## 2021-01-18 NOTE — RAD REPORT
EXAM DESCRIPTION:  RAD - Chest Single View - 1/18/2021 9:48 am

 

CLINICAL HISTORY:  CHEST PAIN

 

TECHNIQUE:  AP portable chest image was obtained 1/18/2021 9:48 am .

 

FINDINGS:  Lungs are clear of a peripheral mass consolidation. Interstitial pattern is mildly promine
nt with baseline for the patient unknown. Heart size is upper normal. Vasculature within normal limit
s. No measurable pleural effusion and no pneumothorax. No acute bony abnormality seen. No acute aorti
c findings suspected.

 

IMPRESSION:  No focal mass or consolidation peer

 

Heart size is upper normal in interstitial pattern is mildly prominent. Baseline is unknown.

 

Mild interstitial edema or infiltrate cannot be excluded.
